# Patient Record
Sex: FEMALE | Race: WHITE | NOT HISPANIC OR LATINO
[De-identification: names, ages, dates, MRNs, and addresses within clinical notes are randomized per-mention and may not be internally consistent; named-entity substitution may affect disease eponyms.]

---

## 2019-03-07 ENCOUNTER — APPOINTMENT (OUTPATIENT)
Dept: VASCULAR SURGERY | Facility: CLINIC | Age: 54
End: 2019-03-07
Payer: COMMERCIAL

## 2019-03-07 VITALS — BODY MASS INDEX: 23.54 KG/M2 | HEIGHT: 67 IN | WEIGHT: 150 LBS

## 2019-03-07 DIAGNOSIS — R29.898 OTHER SYMPTOMS AND SIGNS INVOLVING THE MUSCULOSKELETAL SYSTEM: ICD-10-CM

## 2019-03-07 DIAGNOSIS — Z82.62 FAMILY HISTORY OF OSTEOPOROSIS: ICD-10-CM

## 2019-03-07 DIAGNOSIS — Z78.9 OTHER SPECIFIED HEALTH STATUS: ICD-10-CM

## 2019-03-07 DIAGNOSIS — Z82.61 FAMILY HISTORY OF ARTHRITIS: ICD-10-CM

## 2019-03-07 DIAGNOSIS — F17.200 NICOTINE DEPENDENCE, UNSPECIFIED, UNCOMPLICATED: ICD-10-CM

## 2019-03-07 DIAGNOSIS — Z87.59 PERSONAL HISTORY OF OTHER VENOUS THROMBOSIS AND EMBOLISM: ICD-10-CM

## 2019-03-07 DIAGNOSIS — Z86.2 PERSONAL HISTORY OF DISEASES OF THE BLOOD AND BLOOD-FORMING ORGANS AND CERTAIN DISORDERS INVOLVING THE IMMUNE MECHANISM: ICD-10-CM

## 2019-03-07 DIAGNOSIS — Z86.718 PERSONAL HISTORY OF OTHER VENOUS THROMBOSIS AND EMBOLISM: ICD-10-CM

## 2019-03-07 DIAGNOSIS — M79.604 PAIN IN RIGHT LEG: ICD-10-CM

## 2019-03-07 DIAGNOSIS — Z86.008 OTHER SPECIFIED POSTPROCEDURAL STATES: ICD-10-CM

## 2019-03-07 DIAGNOSIS — Z98.890 OTHER SPECIFIED POSTPROCEDURAL STATES: ICD-10-CM

## 2019-03-07 DIAGNOSIS — M79.89 OTHER SPECIFIED SOFT TISSUE DISORDERS: ICD-10-CM

## 2019-03-07 PROCEDURE — 99203 OFFICE O/P NEW LOW 30 MIN: CPT

## 2019-03-07 PROCEDURE — 93971 EXTREMITY STUDY: CPT

## 2019-03-07 RX ORDER — ASPIRIN 81 MG/1
81 TABLET, COATED ORAL
Refills: 0 | Status: ACTIVE | COMMUNITY

## 2019-03-07 NOTE — ASSESSMENT
[FreeTextEntry1] : 54 y/o F w/ symptomatic right thigh varicose veins, she is s/p right GSV stripping with now recurrance of VV.\par Her symptoms are significant and affect her daily function.\par \par I have prescribed her thigh high grade 2 compression garments which she will wear daily. She will elevate her legs as much as possible. She will f/u with me in 8 weeks to reassess. Depending on how she is doing, she may benefit from stab phlebectomies of these symptomatic varicose veins.

## 2019-03-07 NOTE — HISTORY OF PRESENT ILLNESS
[FreeTextEntry1] : 54 y/o F here for evaluation of right leg painful varicose veins.\par She has a hx of right leg VV, and is s/p "vein stripping" approx 10 yrs ago. After this procedure, her symptoms improved, but approximately 5 yrs ago, she re developed varicose veins on her right leg. Over the past year, the veins have become swollen and painful, and she develops heaviness and weakness of the leg. The symptoms are more pronounced as the day progresses, and sometimes improve in the morning. She does intermittently wear thigh high compression garments, which somewhat improve her symptoms.\par She has a desk job, and has an active lifestyle otherwise. Just adopted her 2 year old grandson, and is very busy taking care of him. The debilitating symptoms of her right leg limit her.\par \par Of note, she reports that she had a hx of DVT s/p last pregnancy (decades ago), which was treated with 3 months of anticoagulation.\par She is a current smoker.

## 2019-03-07 NOTE — REVIEW OF SYSTEMS
[Lower Ext Edema] : lower extremity edema [Limb Pain] : limb pain [Limb Swelling] : limb swelling [Negative] : Psychiatric

## 2019-03-07 NOTE — PROCEDURE
[FreeTextEntry1] : RLE venous DUS performed - no DVT or SVT; GSV has been stripped, focal area of reflux at SFJ

## 2019-03-07 NOTE — PHYSICAL EXAM
[Normal Breath Sounds] : Normal breath sounds [2+] : left 2+ [1+] : left 1+ [Varicose Veins Of Lower Extremities] : present [Varicose Veins Of The Right Leg] : of the right leg [Ankle Swelling On The Left] : moderate [No Rash or Lesion] : No rash or lesion [Alert] : alert [Oriented to Person] : oriented to person [Oriented to Place] : oriented to place [Calm] : calm [] : not present [de-identified] : NAD [de-identified] : NCAT [de-identified] : supple [FreeTextEntry1] : large varicose veins on right anterior and medial thigh, soft and compressible, >5mm [de-identified] : soft, NT, ND; no palpable masses

## 2019-05-02 ENCOUNTER — APPOINTMENT (OUTPATIENT)
Dept: VASCULAR SURGERY | Facility: CLINIC | Age: 54
End: 2019-05-02
Payer: COMMERCIAL

## 2019-05-02 PROCEDURE — 99214 OFFICE O/P EST MOD 30 MIN: CPT

## 2019-05-02 NOTE — HISTORY OF PRESENT ILLNESS
[FreeTextEntry1] : 54 y/o F here for evaluation of right leg painful varicose veins.\par She has a hx of right leg VV, and is s/p "vein stripping" approx 10 yrs ago. After this procedure, her symptoms improved, but approximately 5 yrs ago, she re developed varicose veins on her right leg. Over the past year, the veins have become swollen and painful, and she develops heaviness and weakness of the leg. The symptoms are more pronounced as the day progresses, and sometimes improve in the morning. She does intermittently wear thigh high compression garments, which somewhat improve her symptoms.\par She has a desk job, and has an active lifestyle otherwise. Just adopted her 2 year old grandson, and is very busy taking care of him. The debilitating symptoms of her right leg limit her.\par \par Of note, she reports that she had a hx of DVT s/p last pregnancy (decades ago), which was treated with 3 months of anticoagulation.\par She is a current smoker. [de-identified] : 5/2/19 - She has been compliant with DAILY use of THIGH HIGH 20-30mm HG compression garments. Despite this, she continues to have swollen and painful varicose veins, and heaviness of the leg.

## 2019-05-02 NOTE — ASSESSMENT
[FreeTextEntry1] : 52 y/o F w/ symptomatic right thigh varicose veins, she is s/p right GSV stripping with now recurrance of VV.\par Her symptoms are significant and affect her daily function.\par On follow up, she has been compliant with DAILY use of THIGH HIGH 20-30mm HG compression garments. Despite this, she continues to have swollen and painful varicose veins, and heaviness of the leg. \par \par Discussed risks, benefits of stab phlebectomies, she wishes to proceed.\par Will schedule for RLE stab phlebectomies as per patient's schedule.

## 2019-05-02 NOTE — REVIEW OF SYSTEMS
[Lower Ext Edema] : lower extremity edema [Limb Pain] : limb pain [Limb Swelling] : limb swelling [Negative] : Neurological

## 2019-05-22 ENCOUNTER — RESULT REVIEW (OUTPATIENT)
Age: 54
End: 2019-05-22

## 2019-05-22 ENCOUNTER — APPOINTMENT (OUTPATIENT)
Dept: VASCULAR SURGERY | Facility: HOSPITAL | Age: 54
End: 2019-05-22
Payer: COMMERCIAL

## 2019-05-22 PROCEDURE — 37766 PHLEB VEINS - EXTREM 20+: CPT | Mod: RT

## 2019-05-30 ENCOUNTER — APPOINTMENT (OUTPATIENT)
Dept: VASCULAR SURGERY | Facility: CLINIC | Age: 54
End: 2019-05-30
Payer: COMMERCIAL

## 2019-05-30 PROCEDURE — 99024 POSTOP FOLLOW-UP VISIT: CPT

## 2019-06-04 NOTE — PHYSICAL EXAM
[Normal Breath Sounds] : Normal breath sounds [2+] : left 2+ [1+] : left 1+ [Varicose Veins Of Lower Extremities] : present [Ankle Swelling On The Left] : moderate [Varicose Veins Of The Right Leg] : of the right leg [Alert] : alert [No Rash or Lesion] : No rash or lesion [] : bilaterally [Oriented to Person] : oriented to person [de-identified] : NAD [Oriented to Place] : oriented to place [Calm] : calm [de-identified] : NCAT [FreeTextEntry1] : right leg VV appropriately removed, no further filling [de-identified] : supple [de-identified] : soft, NT, ND; no palpable masses [de-identified] : ecchymosis right leg

## 2019-06-04 NOTE — REASON FOR VISIT
[de-identified] : 5/22/19 [de-identified] : right leg stab phlebectomies [de-identified] : approx 1 weeks s/p right leg stab phlebectomies\par Pt doing well. Denies leg pain or swelling. Has some bruising around phlebectomy sites.

## 2019-06-04 NOTE — DISCUSSION/SUMMARY
[FreeTextEntry1] : 52 y/o 1 week s/p RLE stab phlebectomies\par Incisions well healed, sutures removed, steri strips placed\par She will continue the use of compression garments\par She will f/u with me in 8 weeks, sooner if any problems arise

## 2019-06-27 ENCOUNTER — APPOINTMENT (OUTPATIENT)
Dept: VASCULAR SURGERY | Facility: CLINIC | Age: 54
End: 2019-06-27
Payer: COMMERCIAL

## 2019-06-27 VITALS — HEART RATE: 65 BPM | DIASTOLIC BLOOD PRESSURE: 71 MMHG | SYSTOLIC BLOOD PRESSURE: 116 MMHG

## 2019-06-27 DIAGNOSIS — I83.891 VARICOSE VEINS OF RIGHT LOWER EXTREMITY WITH OTHER COMPLICATIONS: ICD-10-CM

## 2019-06-27 PROCEDURE — 99024 POSTOP FOLLOW-UP VISIT: CPT

## 2019-07-25 PROBLEM — I83.891 SYMPTOMATIC VARICOSE VEINS OF RIGHT LOWER EXTREMITY: Status: ACTIVE | Noted: 2019-03-07

## 2019-07-25 NOTE — REASON FOR VISIT
[de-identified] : right leg stab phlebectomies [de-identified] : 5/22/19 [de-identified] : 4 weeks s/p right leg stab phlebectomies\par Pt doing well. Denies leg pain or swelling. VV no longer filling.

## 2019-07-25 NOTE — DISCUSSION/SUMMARY
[FreeTextEntry1] : 52 y/o 4 week s/p RLE stab phlebectomies\par Incisions well healed\par She will continue the use of compression garments\par She will f/u with me as needed

## 2019-07-25 NOTE — PHYSICAL EXAM
[Normal Breath Sounds] : Normal breath sounds [2+] : left 2+ [1+] : left 1+ [Varicose Veins Of Lower Extremities] : present [Varicose Veins Of The Right Leg] : of the right leg [Ankle Swelling On The Left] : moderate [] : bilaterally [No Rash or Lesion] : No rash or lesion [Alert] : alert [Oriented to Person] : oriented to person [Oriented to Place] : oriented to place [Calm] : calm [de-identified] : NAD [de-identified] : NCAT [de-identified] : supple [FreeTextEntry1] : right leg VV appropriately removed, no further filling [de-identified] : soft, NT, ND; no palpable masses

## 2022-09-14 NOTE — PHYSICAL EXAM
[Normal Breath Sounds] : Normal breath sounds [2+] : right 2+ [Varicose Veins Of Lower Extremities] : present [Varicose Veins Of The Right Leg] : of the right leg [1+] : left 1+ [Ankle Swelling On The Left] : moderate [] : bilaterally [No Rash or Lesion] : No rash or lesion [Alert] : alert [Oriented to Person] : oriented to person [Calm] : calm [Oriented to Place] : oriented to place [de-identified] : NCAT [de-identified] : supple [de-identified] : NAD [de-identified] : soft, NT, ND; no palpable masses [FreeTextEntry1] : large varicose veins on right anterior and medial thigh, soft and compressible, >5mm no...

## 2025-02-19 ENCOUNTER — APPOINTMENT (OUTPATIENT)
Dept: VASCULAR SURGERY | Facility: CLINIC | Age: 60
End: 2025-02-19